# Patient Record
Sex: MALE | Race: ASIAN | Employment: FULL TIME | URBAN - METROPOLITAN AREA
[De-identification: names, ages, dates, MRNs, and addresses within clinical notes are randomized per-mention and may not be internally consistent; named-entity substitution may affect disease eponyms.]

---

## 2020-04-13 ENCOUNTER — APPOINTMENT (OUTPATIENT)
Dept: GENERAL RADIOLOGY | Facility: CLINIC | Age: 30
End: 2020-04-13
Attending: EMERGENCY MEDICINE
Payer: COMMERCIAL

## 2020-04-13 ENCOUNTER — HOSPITAL ENCOUNTER (EMERGENCY)
Facility: CLINIC | Age: 30
Discharge: HOME | End: 2020-04-13
Attending: EMERGENCY MEDICINE | Admitting: EMERGENCY MEDICINE
Payer: COMMERCIAL

## 2020-04-13 VITALS
DIASTOLIC BLOOD PRESSURE: 80 MMHG | SYSTOLIC BLOOD PRESSURE: 107 MMHG | BODY MASS INDEX: 23.84 KG/M2 | OXYGEN SATURATION: 99 % | RESPIRATION RATE: 18 BRPM | HEART RATE: 60 BPM | TEMPERATURE: 97.8 F | HEIGHT: 72 IN | WEIGHT: 176 LBS

## 2020-04-13 DIAGNOSIS — S43.015A ANTERIOR SHOULDER DISLOCATION, LEFT, INITIAL ENCOUNTER: ICD-10-CM

## 2020-04-13 PROCEDURE — 23650 CLTX SHO DSLC W/MNPJ WO ANES: CPT | Mod: LT

## 2020-04-13 PROCEDURE — 99285 EMERGENCY DEPT VISIT HI MDM: CPT | Mod: 25

## 2020-04-13 PROCEDURE — 96376 TX/PRO/DX INJ SAME DRUG ADON: CPT

## 2020-04-13 PROCEDURE — 40000986 XR SHOULDER 2 VIEW LEFT: Mod: LT

## 2020-04-13 PROCEDURE — 96374 THER/PROPH/DIAG INJ IV PUSH: CPT

## 2020-04-13 PROCEDURE — 96375 TX/PRO/DX INJ NEW DRUG ADDON: CPT

## 2020-04-13 PROCEDURE — 25000128 H RX IP 250 OP 636: Performed by: EMERGENCY MEDICINE

## 2020-04-13 PROCEDURE — 25000128 H RX IP 250 OP 636

## 2020-04-13 PROCEDURE — 73030 X-RAY EXAM OF SHOULDER: CPT | Mod: LT

## 2020-04-13 RX ORDER — FENTANYL CITRATE 50 UG/ML
50 INJECTION, SOLUTION INTRAMUSCULAR; INTRAVENOUS ONCE
Status: COMPLETED | OUTPATIENT
Start: 2020-04-13 | End: 2020-04-13

## 2020-04-13 RX ORDER — FENTANYL CITRATE 50 UG/ML
INJECTION, SOLUTION INTRAMUSCULAR; INTRAVENOUS
Status: COMPLETED
Start: 2020-04-13 | End: 2020-04-13

## 2020-04-13 RX ORDER — LORAZEPAM 2 MG/ML
INJECTION INTRAMUSCULAR
Status: DISCONTINUED
Start: 2020-04-13 | End: 2020-04-14 | Stop reason: HOSPADM

## 2020-04-13 RX ORDER — LORAZEPAM 2 MG/ML
1 INJECTION INTRAMUSCULAR ONCE
Status: COMPLETED | OUTPATIENT
Start: 2020-04-13 | End: 2020-04-13

## 2020-04-13 RX ORDER — FENTANYL CITRATE 50 UG/ML
75 INJECTION, SOLUTION INTRAMUSCULAR; INTRAVENOUS ONCE
Status: COMPLETED | OUTPATIENT
Start: 2020-04-13 | End: 2020-04-13

## 2020-04-13 RX ADMIN — FENTANYL CITRATE 50 MCG: 50 INJECTION, SOLUTION INTRAMUSCULAR; INTRAVENOUS at 21:40

## 2020-04-13 RX ADMIN — FENTANYL CITRATE 75 MCG: 50 INJECTION, SOLUTION INTRAMUSCULAR; INTRAVENOUS at 20:54

## 2020-04-13 RX ADMIN — FENTANYL CITRATE 50 MCG: 50 INJECTION INTRAMUSCULAR; INTRAVENOUS at 21:40

## 2020-04-13 RX ADMIN — LORAZEPAM 1 MG: 2 INJECTION INTRAMUSCULAR; INTRAVENOUS at 21:40

## 2020-04-13 ASSESSMENT — ENCOUNTER SYMPTOMS: ARTHRALGIAS: 1

## 2020-04-13 ASSESSMENT — MIFFLIN-ST. JEOR: SCORE: 1801.33

## 2020-04-13 NOTE — ED AVS SNAPSHOT
Emergency Department  [address] [place] [id number]  Phone:  [phone]  Fax:  [phone]                                    Jan Santillan   MRN: [id number]    Department:   Emergency Department   Date of Visit:  4/13/2020           After Visit Summary Signature Page    I have received my discharge instructions, and my questions have been answered. I have discussed any challenges I see with this plan with the nurse or doctor.    ..........................................................................................................................................  Patient/Patient Representative Signature      ..........................................................................................................................................  Patient Representative Print Name and Relationship to Patient    ..................................................               ................................................  Date                                   Time    ..........................................................................................................................................  Reviewed by Signature/Title    ...................................................              ..............................................  Date                                               Time          22EPIC Rev 08/18

## 2020-04-14 NOTE — ED TRIAGE NOTES
Pt. slipped on ice on steps twisting left shoulder. History of shoulder dislocation. Feels similar.

## 2020-04-14 NOTE — ED PROVIDER NOTES
History     Chief Complaint:  Shoulder Injury     The history is provided by the patient.      Jan Santillan is a 29 year old male with a history of previous shoulder dislocation who presents with a left shoulder injury after a mechanical fall down stairs at his apartment right before arrival. He reports he slipped on ice on the stairs, causing him to fall onto his left elbow, but denies any pain in his elbow. He has sensation in his distal extremity and denies clavicle pain. He states that his shoulder pain is similar to pain he experienced during a previous shoulder dislocation that occurred in 2009 when the patient lived in [facility]. He stated that he was not sedated for reduction of this dislocation.     Allergies:  No Known Drug Allergies    Medications:    No current outpatient medications on file.    Past Medical History:     Shoulder dislocation    Past Surgical History:    History reviewed. No pertinent past surgical history.    Family History:    History reviewed. No pertinent family history.      Social History:  The patient was unaccompanied to the ED.  Alcohol Use: no    Review of Systems   Musculoskeletal: Positive for arthralgias (+ L shoulder, - L elbow).        Negative clavicle pain   All other systems reviewed and are negative.      Physical Exam     Patient Vitals for the past 24 hrs:   BP Temp Temp src Pulse Resp SpO2 Height Weight   04/13/20 2047 107/80 97.8  F (36.6  C) Oral 60 18 99 % 1.829 m (6') 79.8 kg (176 lb)       Physical Exam  General: Patient in mild distress.  Alert and cooperative with exam. Normal mentation  HEENT: NC/AT. Conjunctiva without injection or scleral icterus. External ears normal.  Respiratory: Breathing comfortably on room air  CV: Normal rate, all extremities well perfused  GI:  Non-distended abdomen  Skin: Warm, dry, no rashes/open wounds on exposed skin  Musculoskeletal: LUE: CMS intact. Anterior prominence of shoulder consistent with anterior shoulder  "dislocation. Elbow and wrist exam normal. No clavicle.   Neuro: Alert, answers questions appropriately. No gross motor deficits  Psychiatric: Normal mood and affect.      Emergency Department Course   Imaging:  Radiology findings were communicated with the patient who voiced understanding of the findings.      XR Shoulder Left 2 Views   Final Result   IMPRESSION: Status post reduction of the previously seen anterior shoulder dislocation. Flattening of the posterior lateral humeral head, query presence of Hill-Sachs deformity. No acromial clavicular separation.       XR Shoulder Left 2 Views   Final Result   IMPRESSION: Anterior, subcoracoid dislocation of the humeral head. No evidence of a fracture.         Procedures      Reduction - left anterior shoulder dislocation     SITE: left shoulder     PROCEDURE PROVIDER: Dr. Darian Guevara     CONSENT: Risks (including but not limited to: decreased respirations, oxygen perfusion, aspiration, hypotension), benefits, and alternatives were discussed with the patient and verbal consent for procedure was obtained.       Patient was given 50 mcg of fentanyl for pain and 1 mg Ativan for anxiolysis.     REDUCTION COMMENTS: The patient's arm was held in traction and externally rotated until a \"pop\" was felt. The patient's shoulder then appeared reduced with improved alignment. Post reductions X-rays were obtained and showed good reduction.     Shoulder immobilizer was placed.  Patient tolerated the procedure without complication.    Interventions:  2054 Fentanyl 75 mcg IV  2140 Fentanyl 50 mcg IV  2140 Ativan 1 mg IV  Medications   fentaNYL (PF) (SUBLIMAZE) injection 75 mcg (75 mcg Intravenous Given 4/13/20 2054)   LORazepam (ATIVAN) injection 1 mg (1 mg Intravenous Given 4/13/20 2140)   fentaNYL (PF) (SUBLIMAZE) injection 50 mcg (50 mcg Intravenous Given 4/13/20 2140)       Emergency Department Course:  Past medical records, nursing notes, and vitals reviewed.    (2038)   I " performed an exam of the patient as documented above.     (2042)   Attempted replacement of shoulder without success. Patient complains of difficulty breathing and dizziness when attempting.    The patient was sent for a XR shoulder left while in the emergency department, results above.     (2132)   Rechecked on patient to reduce shoulder as noted in the above procedure.     The patient was sent for a XR shoulder postreduction while in the emergency department, results above.       Impression & Plan     Medical Decision Making:    Jan Santillan is a 29 year old male, with history of prior shoulder dislocation in 2009, who presented with an acute left anterior glenohumeral joint dislocation, confirmed on x-ray following a mechanical fall prior to arrival as described in the HPI above.  There is no evidence as above of neurovascular compromise.  The joint was reduced as above. He was provided a shoulder immobilizer after reduction and has good pain relief. Post-reduction x-ray shows no significant complication.  I discussed the natural history of shoulder dislocation, the possibility of underlying soft tissue injury, and precautions against recurrent dislocation. I have advised him to return for recurrent dislocation, worse pain, numbness, or any other concerns. I recommended orthopedic follow-up in 3-5 days.      Diagnosis:    ICD-10-CM    1. Anterior shoulder dislocation, left, initial encounter  S43.015A        Disposition:  Discharged      Scribe Disclosure:  ILaz, am serving as a scribe at 8:35 PM on 4/13/2020 to document services personally performed by Rishabh Guevara DO based on my observations and the provider's statements to me.     Scribe Disclosure:  I, Brielle Jordan, am serving as a scribe at 8:49 PM on 4/13/2020 to document services personally performed by Rishabh Guevara DO based on my observations and the provider's statements to me.     4/13/2020    EMERGENCY  DEPARTMENT       [place], Rishabh Nogueira, DO  04/13/20 [id number]

## 2021-01-04 ENCOUNTER — HEALTH MAINTENANCE LETTER (OUTPATIENT)
Age: 31
End: 2021-01-04

## 2021-10-10 ENCOUNTER — HEALTH MAINTENANCE LETTER (OUTPATIENT)
Age: 31
End: 2021-10-10

## 2022-01-30 ENCOUNTER — HEALTH MAINTENANCE LETTER (OUTPATIENT)
Age: 32
End: 2022-01-30

## 2022-09-24 ENCOUNTER — HEALTH MAINTENANCE LETTER (OUTPATIENT)
Age: 32
End: 2022-09-24

## 2023-05-08 ENCOUNTER — HEALTH MAINTENANCE LETTER (OUTPATIENT)
Age: 33
End: 2023-05-08